# Patient Record
Sex: FEMALE | Race: WHITE
[De-identification: names, ages, dates, MRNs, and addresses within clinical notes are randomized per-mention and may not be internally consistent; named-entity substitution may affect disease eponyms.]

---

## 2019-07-14 ENCOUNTER — HOSPITAL ENCOUNTER (EMERGENCY)
Dept: HOSPITAL 8 - ED | Age: 34
Discharge: HOME | End: 2019-07-14
Payer: MEDICAID

## 2019-07-14 VITALS — BODY MASS INDEX: 21.83 KG/M2 | HEIGHT: 64 IN | WEIGHT: 127.87 LBS

## 2019-07-14 VITALS — DIASTOLIC BLOOD PRESSURE: 88 MMHG | SYSTOLIC BLOOD PRESSURE: 124 MMHG
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
## 2019-07-14 DIAGNOSIS — F17.200: ICD-10-CM

## 2019-07-14 DIAGNOSIS — S09.90XA: Primary | ICD-10-CM

## 2019-07-14 PROCEDURE — 70450 CT HEAD/BRAIN W/O DYE: CPT

## 2019-07-14 PROCEDURE — 99284 EMERGENCY DEPT VISIT MOD MDM: CPT

## 2019-07-14 NOTE — NUR
PT AMBULATORY TO ROOM WITH STEADY GAIT FROM [facility]. ASSUMING PT CARE AT THIS 
TIME. PT STATES SHE TRIPPED AND FELL AT APPROX 0930 THIS AM. HIT R SIDE OF 
HEAD, NO LAC OR BLEEDING NOTED. PT C/O HEAD AND LEFT LATERAL NECK PAIN. PT 
STATES SHE BECAME NAUSEAS AFTER FALL AND HAD BLURRED VISION WHICH SUBSIDED 
SHORTLY AFTER. PT DENIES LOC. PT STATES WHILE WAITING IN [facility] SHE HAD NUMBNESS 
ON LEFT SIDE OF BODY WHICH HAS SUBSIDED. OTHERWISE NEURO INTACT AT THIS TIME. 
RESTING ON GURNEY. FAMILY AT BEDSIDE. CALL LIGHT IN REACH. VSS.

## 2019-08-02 ENCOUNTER — GYNECOLOGY VISIT (OUTPATIENT)
Dept: OBGYN | Facility: CLINIC | Age: 34
End: 2019-08-02
Payer: MEDICAID

## 2019-08-02 ENCOUNTER — HOSPITAL ENCOUNTER (OUTPATIENT)
Dept: LAB | Facility: MEDICAL CENTER | Age: 34
End: 2019-08-02
Attending: NURSE PRACTITIONER
Payer: MEDICAID

## 2019-08-02 VITALS
BODY MASS INDEX: 22.02 KG/M2 | WEIGHT: 129 LBS | HEIGHT: 64 IN | DIASTOLIC BLOOD PRESSURE: 60 MMHG | SYSTOLIC BLOOD PRESSURE: 102 MMHG

## 2019-08-02 DIAGNOSIS — N93.8 DUB (DYSFUNCTIONAL UTERINE BLEEDING): ICD-10-CM

## 2019-08-02 DIAGNOSIS — Z32.01 POSITIVE URINE PREGNANCY TEST: Primary | ICD-10-CM

## 2019-08-02 DIAGNOSIS — Z32.01 POSITIVE URINE PREGNANCY TEST: ICD-10-CM

## 2019-08-02 LAB — B-HCG SERPL-ACNC: 4838.4 MIU/ML (ref 0–5)

## 2019-08-02 PROCEDURE — 84702 CHORIONIC GONADOTROPIN TEST: CPT

## 2019-08-02 PROCEDURE — 99202 OFFICE O/P NEW SF 15 MIN: CPT | Performed by: NURSE PRACTITIONER

## 2019-08-02 PROCEDURE — 36415 COLL VENOUS BLD VENIPUNCTURE: CPT

## 2019-08-02 RX ORDER — HYDROXYZINE HYDROCHLORIDE 25 MG/1
TABLET, FILM COATED ORAL
Refills: 1 | COMMUNITY
Start: 2019-05-29 | End: 2019-08-14

## 2019-08-02 NOTE — PROGRESS NOTES
"Maya Cervantes,  34 y.o.  female with Patient's last menstrual period was 2019. presents today with complaint of dysfunctional uterine bleeding.    Subjective : Patient presents to the office for absent menses.    Nausea/Vomiting: No    Abdominal /pelvic cramping: No  Vaginal bleeding: No  Positive home UPT no  Partner male, involved  Other symptoms: none    Pertinent positives documented in HPI and all other systems reviewed & are negative    OB History    Para Term  AB Living   3 2 2     2   SAB TAB Ectopic Molar Multiple Live Births             2      # Outcome Date GA Lbr Braxton/2nd Weight Sex Delivery Anes PTL Lv   3 Current            2 Term 06/08/10 40w0d  3.289 kg (7 lb 4 oz) M CS-Unspec   BENIGNO      Complications: Nuchal cord affecting delivery   1 Term 06 41w0d  3.345 kg (7 lb 6 oz) M Vag-Spont  N BENIGNO       Past Gyn history: last pap 2018    Past Medical History:   Diagnosis Date   • Anxiety    • Depression    • Fracture 2013    left leg       Past Surgical History:   Procedure Laterality Date   • HERNIA REPAIR     • PRIMARY C SECTION         Meds: zoloft, wellbutrin    Allergies: Patient has no known allergies.    Physical Exam:    /60 (BP Location: Right arm, Patient Position: Sitting)   Ht 1.626 m (5' 4\")   Wt 58.5 kg (129 lb)   LMP 2019   BMI 22.14 kg/m²   Gen: well-appearing, well-hydrated, well-nourished, in no apparent distress, pleasant and verbal  HEENT: normal;   PERRLA, EOMI, fundi grossly normal, no papilledema, no AV nicking, sclera clear  Lungs: Clear  Heart: RRR No M  Abd: abdomen is soft without significant tenderness, masses, organomegaly or guarding  Pelvic: deferred  Ext: NT bilaterally, no cyanosis, clubbing or edema    No results found for this or any previous visit (from the past 336 hour(s)).    Beside Ultrasound: yoke sac seen, no cardiac activity noted    Assessment:  34 y.o.   amenorrhea  Pregnancy exam/test " positive    Plan:   for dating  HCG quants  Normal pregnancy symptoms discussed  Call MD w/ questions or concerns

## 2019-08-02 NOTE — NON-PROVIDER
Patient here for GYN/DUB.  LMP=6/17/19  ANDREA=3/23/20  GA=6w4d  Last pap=1/2018=negative  Phone number: [phone]  Pharmacy verified  c/o

## 2019-08-05 ENCOUNTER — HOSPITAL ENCOUNTER (OUTPATIENT)
Dept: LAB | Facility: MEDICAL CENTER | Age: 34
End: 2019-08-05
Attending: NURSE PRACTITIONER
Payer: MEDICAID

## 2019-08-05 DIAGNOSIS — Z32.01 POSITIVE URINE PREGNANCY TEST: ICD-10-CM

## 2019-08-05 LAB — B-HCG SERPL-ACNC: ABNORMAL MIU/ML (ref 0–5)

## 2019-08-05 PROCEDURE — 36415 COLL VENOUS BLD VENIPUNCTURE: CPT

## 2019-08-05 PROCEDURE — 84702 CHORIONIC GONADOTROPIN TEST: CPT

## 2019-08-07 ENCOUNTER — INITIAL PRENATAL (OUTPATIENT)
Dept: OBGYN | Facility: CLINIC | Age: 34
End: 2019-08-07
Payer: MEDICAID

## 2019-08-07 VITALS
DIASTOLIC BLOOD PRESSURE: 62 MMHG | SYSTOLIC BLOOD PRESSURE: 100 MMHG | HEIGHT: 64 IN | BODY MASS INDEX: 21.85 KG/M2 | WEIGHT: 128 LBS

## 2019-08-07 DIAGNOSIS — O20.0 THREATENED ABORTION: ICD-10-CM

## 2019-08-07 PROCEDURE — 76830 TRANSVAGINAL US NON-OB: CPT | Performed by: OBSTETRICS & GYNECOLOGY

## 2019-08-07 PROCEDURE — 99213 OFFICE O/P EST LOW 20 MIN: CPT | Mod: 25 | Performed by: OBSTETRICS & GYNECOLOGY

## 2019-08-07 NOTE — PROGRESS NOTES
Pt here for  visit   # [phone]  Pt states having nausea and having some stretching pains on her abdominal area.

## 2019-08-07 NOTE — PROGRESS NOTES
"Subjective:      Maya Cervantes is a 34 y.o. female who presents  for fetal viability evaluation            HPI patient is a 34-year-old  3 para 2 with last menstrual period of 2019 who presents today for fetal viability study.  She had a new OB exam recently and a limited bedside scan by the midwife shows an intrauterine gestational sac with fetus was not visualized.    Patient is doing well except for some mild nausea without emesis.  She reports some lower pelvic cramping symptoms intermittently.  Denies any bleeding or spotting.  She is taking prenatal vitamins.  Pregnancy was unplanned but desired-patient was not using contraception    ROS all organ systems were reviewed and were negative except for complaints in HPI       Objective:     /62   Ht 1.626 m (5' 4\")   Wt 58.1 kg (128 lb)   LMP 2019   BMI 21.97 kg/m²       Physical Exam   Constitutional: She is oriented to person, place, and time. She appears well-developed and well-nourished. No distress.   Neck: Normal range of motion. Neck supple. No thyromegaly present.   Cardiovascular: Normal rate, regular rhythm, normal heart sounds and intact distal pulses.   No murmur heard.  Pulmonary/Chest: Effort normal and breath sounds normal. No stridor. No respiratory distress. She has no wheezes.   Abdominal: Soft. Bowel sounds are normal. She exhibits no distension and no mass. There is no guarding.   Neurological: She is alert and oriented to person, place, and time. No cranial nerve deficit.   Skin: Skin is warm and dry. No rash noted. She is not diaphoretic. No erythema.   Psychiatric: She has a normal mood and affect. Her behavior is normal. Thought content normal.   Nursing note and vitals reviewed.         Transvaginal ultrasound was performed and read by me:    Intrauterine gestational sac with yolk sac and a small area that appears to be fetal pole noted  No fetal cardiac activity was detected  There was an additional " saclike structures smaller than the yolk sac on the opposite side of the yolk sac.  Gestational sac diameter measures 1.53 cm corresponding to 6 weeks and 2 days gestation  Normal left adnexa noted  Right ovary contains a bilobed cyst.  The right ovary measures 5.12 x 3.39 cm larger cyst diameter measuring 2.53 cm  No pelvic free fluid noted    Impression: Intrauterine gestational sac with yolk sac and possible small fetal pole noted.  No fetal cardiac activity noted.  Patient has increasing beta-hCG levels.  We discussed that this could represent a blighted ovum versus an early IUP.  Repeat beta hCG level ordered for Friday and patient will follow-up next week for repeat ultrasound     Assessment/Plan:     1. Threatened   34-year-old with early IUP versus an embryonic demise.  Patient is currently stable.  We will repeat beta-hCG level on Friday and repeat ultrasound in 1 week  Precautions and plan of care reviewed  - HCG QUANTITATIVE; Future    2.  Right ovarian bilobed cyst noted on ultrasound.  Patient counseled on findings.  Cautions discussed

## 2019-08-09 ENCOUNTER — HOSPITAL ENCOUNTER (OUTPATIENT)
Dept: LAB | Facility: MEDICAL CENTER | Age: 34
End: 2019-08-09
Attending: OBSTETRICS & GYNECOLOGY
Payer: MEDICAID

## 2019-08-09 DIAGNOSIS — O20.0 THREATENED ABORTION: ICD-10-CM

## 2019-08-09 LAB — B-HCG SERPL-ACNC: ABNORMAL MIU/ML (ref 0–5)

## 2019-08-09 PROCEDURE — 36415 COLL VENOUS BLD VENIPUNCTURE: CPT

## 2019-08-09 PROCEDURE — 84702 CHORIONIC GONADOTROPIN TEST: CPT

## 2019-08-14 ENCOUNTER — INITIAL PRENATAL (OUTPATIENT)
Dept: OBGYN | Facility: CLINIC | Age: 34
End: 2019-08-14
Payer: MEDICAID

## 2019-08-14 VITALS — DIASTOLIC BLOOD PRESSURE: 58 MMHG | BODY MASS INDEX: 22.49 KG/M2 | WEIGHT: 131 LBS | SYSTOLIC BLOOD PRESSURE: 100 MMHG

## 2019-08-14 DIAGNOSIS — N93.8 DUB (DYSFUNCTIONAL UTERINE BLEEDING): ICD-10-CM

## 2019-08-14 DIAGNOSIS — Z32.01 POSITIVE PREGNANCY TEST: ICD-10-CM

## 2019-08-14 PROCEDURE — 76830 TRANSVAGINAL US NON-OB: CPT | Performed by: OBSTETRICS & GYNECOLOGY

## 2019-08-14 PROCEDURE — 99214 OFFICE O/P EST MOD 30 MIN: CPT | Mod: 25 | Performed by: OBSTETRICS & GYNECOLOGY

## 2019-08-14 NOTE — PROGRESS NOTES
CC: Missed menses    Maya Cervantes is a 34 y.o.  who presents presents due to missed menses. Patient's last menstrual period was 2019. Reports she first had a positive pregnancy test on 19 and was seen on  at which time US showed GS with YS but no definitive FP.   She was not using anything for birthcontrol.  This is a planned and desired pregnancy.   Reports she has been feeling fine thus far in pregnancy. She denies nausea/vomiting, denies headache, denies dysuria, denies  vaginal bleeding/spotting, and denies contractions/cramping.    Partner: Greg    Review of systems:  Pertinent positives documented in HPI and all other systems reviewed & are negative    GYN History:  Patient's last menstrual period was 2019.. Menarche @11.  Menses regular, lasting 5 days, not particularly heavy.  Last pap last year,  no h/o abnormal pap.  No history of cone biopsy, LEEP or any other cervical, uterine or gynecologic surgery. No history of sexually transmitted diseases.  Currently sexually active with one partner.  Utilizing nothing for contraception and has used OCPs in the past.     OB History:    OB History    Para Term  AB Living   3 2 2     2   SAB TAB Ectopic Molar Multiple Live Births             2      # Outcome Date GA Lbr Braxton/2nd Weight Sex Delivery Anes PTL Lv   3 Current            2 Term 06/08/10 40w0d  3.289 kg (7 lb 4 oz) M CS-Unspec   BENIGNO      Complications: Nuchal cord affecting delivery   1 Term 06 41w0d  3.345 kg (7 lb 6 oz) M Vag-Spont  N BENIGNO   CS for NRFS    All PMH, PSH, allergies, social history and FH reviewed and updated today:  Past Medical History:  Past Medical History:   Diagnosis Date   • Anxiety    • Depression    • Fracture 2013    left leg     Past Surgical History:  Past Surgical History:   Procedure Laterality Date   • HERNIA REPAIR     • PRIMARY C SECTION       Medications:   Current Outpatient Medications Ordered in Epic   Medication  Sig Dispense Refill   • sertraline (ZOLOFT) 50 MG Tab Take 50 mg by mouth every day.     • Naltrexone (VIVITROL) 380 MG Recon Susp by Intramuscular route.       No current Epic-ordered facility-administered medications on file.      Allergies: Patient has no known allergies.    Social History:  Social History     Socioeconomic History   • Marital status: Single     Spouse name: Not on file   • Number of children: Not on file   • Years of education: Not on file   • Highest education level: Not on file   Occupational History   • Not on file   Social Needs   • Financial resource strain: Not on file   • Food insecurity:     Worry: Not on file     Inability: Not on file   • Transportation needs:     Medical: Not on file     Non-medical: Not on file   Tobacco Use   • Smoking status: Light Tobacco Smoker   • Smokeless tobacco: Never Used   Substance and Sexual Activity   • Alcohol use: Not Currently     Comment: rare   • Drug use: No     Comment: was using heroin nothing since 5/2019   • Sexual activity: Yes     Partners: Male   Lifestyle   • Physical activity:     Days per week: Not on file     Minutes per session: Not on file   • Stress: Not on file   Relationships   • Social connections:     Talks on phone: Not on file     Gets together: Not on file     Attends [employer] service: Not on file     Active member of club or organization: Not on file     Attends meetings of clubs or organizations: Not on file     Relationship status: Not on file   • Intimate partner violence:     Fear of current or ex partner: Not on file     Emotionally abused: Not on file     Physically abused: Not on file     Forced sexual activity: Not on file   Other Topics Concern   • Not on file   Social History Narrative   • Not on file       Family History:  History reviewed. No pertinent family history.        Objective:   Vitals:  /58   Wt 59.4 kg (131 lb)   Body mass index is 22.49 kg/m². (Goal BM I>18 <25)  Exam:  General: appears stated  age  Head: normocephalic, non-tender  Neck: there is full range of motion  Abdomen: Bowel sounds positive, nondistended, soft, nontender x4, no rebound or guarding. No organomegaly. No masses.   Female GYN: normal female external genitalia without lesions  Skin: No rashes, or ulcers or lesions seen  Psychiatric: appropriate affect, alert and oriented x3, intact judgment and insight.    Procedure:  Transvaginal US performed by me and per my read:    Indication: dates/location.     Findings: twin intrauterine pregnancy @ 6w3d by CRL x2.  2 YS, one GS and do not see dividing membrane.  Embryo on maternal right/posterior:  CRL 6w3d; heart rate 123 bpm  Embryo on maternal left/anterior: CRL 6w2d;  heart rate 126 bpm   ANDREA 2020 Positive fetal cardiac activity @ 122bpm and  BPM.  Right ovary with simple cyst 3.2 x 3.8 cm. Left Ovary WNL.  Cervical length 4.4cm. No free fluid in the cul-de-sac.  Impression: viable TIUP @ 6w3d.  ANDREA by US of 20.    Recent Results (from the past 336 hour(s))   HCG QUANTITATIVE    Collection Time: 19  9:31 AM   Result Value Ref Range    Bhcg 4838.4 (H) 0.0 - 5.0 mIU/mL   HCG QUANTITATIVE    Collection Time: 19  2:25 PM   Result Value Ref Range    Bhcg 16135.8 (H) 0.0 - 5.0 mIU/mL   HCG QUANTITATIVE    Collection Time: 19  4:32 PM   Result Value Ref Range    Bhcg 45418.7 (H) 0.0 - 5.0 mIU/mL      Pregnancy exam/test positive    A/P:   34 y.o.  here for   1. Positive pregnancy test  US-OB 1ST TRIMESTER WITH TRANSVAGINAL (COMBO)   2. DUB (dysfunctional uterine bleeding)  US-OB 1ST TRIMESTER WITH TRANSVAGINAL (COMBO)       #Missed menses - amenorrhea due to pregnancy.  Twin intrauterine pregnancy seen on ultrasound today.  Better angle of twin measuring 6 weeks 3 days and therefore will date pregnancy based off this measurement.  This gives ANDREA of 2020.  No dividing membrane is seen in 2 yolk sacs are seen today.  Formal ultrasound has been ordered to establish  type of twin pregnancy.  This is discussed with patient and her partner today.  They are very excited and overwhelmed.  Precautions about SAB/vanishing twin are discussed with them today as well.    --Normal pregnancy s/s discussed  --Advised prenatal vitamins, healthy well rounded diet, adequate hydration, and continued exercise.    #Tobacco use.  Advised patient to avoid all tobacco products including vaping  #Will order prenatal labs and any additional imaging/testing needed at new OB visit  #SAB precautions discussed.  #Follow up in 2-4 weeks for new OB visit.    30 minutes were spent in face-to-face patient contact with patient, greater than 50% of which was was spent in counseling and coordination of care for her newly diagnosed pregnancy including medical and surgical options of care.    RISHABH

## 2019-08-21 ENCOUNTER — APPOINTMENT (OUTPATIENT)
Dept: RADIOLOGY | Facility: IMAGING CENTER | Age: 34
End: 2019-08-21
Attending: OBSTETRICS & GYNECOLOGY
Payer: MEDICAID

## 2019-08-21 DIAGNOSIS — Z32.01 POSITIVE PREGNANCY TEST: ICD-10-CM

## 2019-08-21 DIAGNOSIS — N93.8 DUB (DYSFUNCTIONAL UTERINE BLEEDING): ICD-10-CM

## 2019-08-21 PROCEDURE — 76802 OB US < 14 WKS ADDL FETUS: CPT | Mod: 59 | Performed by: OBSTETRICS & GYNECOLOGY

## 2019-08-21 PROCEDURE — 76801 OB US < 14 WKS SINGLE FETUS: CPT | Performed by: OBSTETRICS & GYNECOLOGY

## 2019-08-21 PROCEDURE — 76802 OB US < 14 WKS ADDL FETUS: CPT | Performed by: OBSTETRICS & GYNECOLOGY

## 2019-08-28 ENCOUNTER — ROUTINE PRENATAL (OUTPATIENT)
Dept: OBGYN | Facility: CLINIC | Age: 34
End: 2019-08-28
Payer: MEDICAID

## 2019-08-28 VITALS — SYSTOLIC BLOOD PRESSURE: 100 MMHG | WEIGHT: 132 LBS | BODY MASS INDEX: 22.66 KG/M2 | DIASTOLIC BLOOD PRESSURE: 60 MMHG

## 2019-08-28 DIAGNOSIS — O09.90 HIGH RISK FOR INTRAPARTUM COMPLICATIONS, ANTEPARTUM: Primary | ICD-10-CM

## 2019-08-28 DIAGNOSIS — N93.8 DUB (DYSFUNCTIONAL UTERINE BLEEDING): ICD-10-CM

## 2019-08-28 PROCEDURE — 90040 PR PRENATAL FOLLOW UP: CPT | Performed by: NURSE PRACTITIONER

## 2019-08-28 NOTE — PROGRESS NOTES
Patient here stating she has a lot of questions about her pregnancy.  Denies any problems. Aware she needs to schedule NOB visit.

## 2019-08-29 NOTE — PROGRESS NOTES
Maya and her partner are here today because they have many questions r/t a triplet pregnancy.  She is c/o difficulty sleeping.  Discussed with pt sleep hygiene including exercise, routine prior to bed, turning off cell phone, having a bath.  Also discussed with her the importance of healthy diet.  Discussed we are unable to tell at this time if all three babies were from a single sperm and egg.  Discussed need to f/u with Dr Shelton and referral placed. Discussed we have no set goal at this point as to length of pregnancy, but discussed the further gestation, the better. Aware I am unable to discuss plan for delivery at this time.  Also discussed activities, such as soccer and other contact sports should be avoided.  Discussed there is no reason at this time to refrain from sexual intercourse.    Discussed SAB precautions and pt to schedule new OB visit in next couple of weeks.

## 2019-09-04 NOTE — PROGRESS NOTES
Referral faxed to Dr. SANDHU on 9/4/19  They will contact patient to schedule appt.  Please check with patient if appt was made/ document. Thank you

## 2020-01-28 ENCOUNTER — INITIAL PRENATAL (OUTPATIENT)
Dept: OBGYN | Facility: CLINIC | Age: 35
End: 2020-01-28

## 2020-01-28 VITALS — WEIGHT: 138 LBS | DIASTOLIC BLOOD PRESSURE: 62 MMHG | BODY MASS INDEX: 23.69 KG/M2 | SYSTOLIC BLOOD PRESSURE: 104 MMHG

## 2020-01-28 DIAGNOSIS — N91.2 AMENORRHEA: ICD-10-CM

## 2020-01-28 PROCEDURE — 99203 OFFICE O/P NEW LOW 30 MIN: CPT | Performed by: OBSTETRICS & GYNECOLOGY

## 2020-01-28 NOTE — PROGRESS NOTES
Cc: Confirmation of pregnancy    HPI:  The patient is a 35 y.o.  32w1d based upon  Patient's last menstrual period was 2019.. She was using no birth control method.  Patient reports last menstrual period was end of October begin 2019.  No vaginal bleeding since.  She reports a positive pregnancy test in 2019.  No pregnancy test have been taken since then.  She denies any nausea, vomiting, diarrhea, chest pain, shortness of breath or vaginal bleeding.    Urine pregnancy test today in clinic was negative    Patient underwent elective termination in 2019.      She presents for a confirmation of pregnancy.  She denies  fetal movement,  denies  vaginal bleeding,  denies  leakage of fluid,  denies contractions.   She denies nausea/vomiting, denies headache, and denies dysuria.      Review of systems:  Pertinent positives documented in HPI and all other systems reviewed & are negative    OB History    Para Term  AB Living   3 2 2     2   SAB TAB Ectopic Molar Multiple Live Births             2      # Outcome Date GA Lbr Braxton/2nd Weight Sex Delivery Anes PTL Lv   3 Current            2 Term 06/08/10 40w0d  3.289 kg (7 lb 4 oz) M CS-Unspec   BENIGNO      Complications: Nuchal cord affecting delivery   1 Term 06 41w0d  3.345 kg (7 lb 6 oz) M Vag-Spont  N BENIGNO     Past Medical History:   Diagnosis Date   • Anxiety    • Depression    • Fracture 2013    left leg     Past Surgical History:   Procedure Laterality Date   • HERNIA REPAIR     • PRIMARY C SECTION       Social History     Socioeconomic History   • Marital status: Single     Spouse name: Not on file   • Number of children: Not on file   • Years of education: Not on file   • Highest education level: Not on file   Occupational History   • Not on file   Social Needs   • Financial resource strain: Not on file   • Food insecurity:     Worry: Not on file     Inability: Not on file   • Transportation needs:     Medical:  Not on file     Non-medical: Not on file   Tobacco Use   • Smoking status: Light Tobacco Smoker   • Smokeless tobacco: Never Used   Substance and Sexual Activity   • Alcohol use: Not Currently     Comment: rare   • Drug use: No     Comment: was using heroin nothing since 5/2019   • Sexual activity: Yes     Partners: Male   Lifestyle   • Physical activity:     Days per week: Not on file     Minutes per session: Not on file   • Stress: Not on file   Relationships   • Social connections:     Talks on phone: Not on file     Gets together: Not on file     Attends Anabaptism service: Not on file     Active member of club or organization: Not on file     Attends meetings of clubs or organizations: Not on file     Relationship status: Not on file   • Intimate partner violence:     Fear of current or ex partner: Not on file     Emotionally abused: Not on file     Physically abused: Not on file     Forced sexual activity: Not on file   Other Topics Concern   • Not on file   Social History Narrative   • Not on file     No family history on file.  Allergies:   Allergies as of 01/28/2020   • (No Known Allergies)       PE:    There were no vitals taken for this visit.      General:appears stated age, is in no apparent distress, is well developed and well nourished  Head: normocephalic, non-tender  Neck: neck is supple  Abdomen: Bowel sounds positive, nondistended, soft, nontender x4, no rebound or guarding. No organomegaly. No masses.  Female GYN: normal female external genitalia without lesionsnormal vagina and normal vaginal tone, normal cervix, normal uterus, size and consistency, normal adnexa without tenderness  Skin: No rashes, or ulcers or lesions seen  Psychiatric: Patient shows appropriate affect, is alert and oriented x3, intact judgment and insight.    Transvaginal US performed and per my read:    Indication: Dating/Viability    Findings: Normal-appearing uterine cavity with no gestational sac, fetal pole or yolk sac  noted.  Right ovary normal. Left Ovary normal. Cervical length 4 cm. No free fluid in the cul-de-sac.    Impression: No evidence of intrauterine pregnancy.      A/P:   1. Amenorrhea  HCG QUANTITATIVE       1.  We will obtain serum hCG levels for final diagnosis.  Will call patient with results    If negative, patient interested in birth control as she does not desire any further pregnancies.  Follow-up for birth control counseling if negative.    If patient continues to have no vaginal bleeding, may need evaluation with [facility] due to recent uterine cementation/curettage and possibility of Asherman syndrome.    All questions answered

## 2020-01-31 ENCOUNTER — HOSPITAL ENCOUNTER (OUTPATIENT)
Dept: LAB | Facility: MEDICAL CENTER | Age: 35
End: 2020-01-31
Attending: OBSTETRICS & GYNECOLOGY
Payer: MEDICAID

## 2020-01-31 DIAGNOSIS — N91.2 AMENORRHEA: ICD-10-CM

## 2020-01-31 LAB — B-HCG SERPL-ACNC: 0.7 MIU/ML (ref 0–5)

## 2020-01-31 PROCEDURE — 84702 CHORIONIC GONADOTROPIN TEST: CPT

## 2020-01-31 PROCEDURE — 36415 COLL VENOUS BLD VENIPUNCTURE: CPT

## 2020-02-02 ENCOUNTER — APPOINTMENT (OUTPATIENT)
Dept: RADIOLOGY | Facility: MEDICAL CENTER | Age: 35
End: 2020-02-02
Attending: EMERGENCY MEDICINE
Payer: MEDICAID

## 2020-02-02 ENCOUNTER — HOSPITAL ENCOUNTER (EMERGENCY)
Facility: MEDICAL CENTER | Age: 35
End: 2020-02-02
Attending: EMERGENCY MEDICINE
Payer: MEDICAID

## 2020-02-02 VITALS
RESPIRATION RATE: 16 BRPM | DIASTOLIC BLOOD PRESSURE: 60 MMHG | TEMPERATURE: 98.4 F | HEIGHT: 64 IN | WEIGHT: 139.55 LBS | BODY MASS INDEX: 23.82 KG/M2 | HEART RATE: 49 BPM | SYSTOLIC BLOOD PRESSURE: 99 MMHG | OXYGEN SATURATION: 99 %

## 2020-02-02 DIAGNOSIS — O03.9 MISCARRIAGE: ICD-10-CM

## 2020-02-02 LAB
ALBUMIN SERPL BCP-MCNC: 4.1 G/DL (ref 3.2–4.9)
ALBUMIN/GLOB SERPL: 1.3 G/DL
ALP SERPL-CCNC: 65 U/L (ref 30–99)
ALT SERPL-CCNC: 22 U/L (ref 2–50)
ANION GAP SERPL CALC-SCNC: 7 MMOL/L (ref 0–11.9)
AST SERPL-CCNC: 24 U/L (ref 12–45)
B-HCG SERPL-ACNC: 0.8 MIU/ML (ref 0–5)
BASOPHILS # BLD AUTO: 0.6 % (ref 0–1.8)
BASOPHILS # BLD: 0.04 K/UL (ref 0–0.12)
BILIRUB SERPL-MCNC: 0.6 MG/DL (ref 0.1–1.5)
BUN SERPL-MCNC: 15 MG/DL (ref 8–22)
CALCIUM SERPL-MCNC: 9.5 MG/DL (ref 8.5–10.5)
CHLORIDE SERPL-SCNC: 107 MMOL/L (ref 96–112)
CO2 SERPL-SCNC: 26 MMOL/L (ref 20–33)
CREAT SERPL-MCNC: 0.86 MG/DL (ref 0.5–1.4)
EOSINOPHIL # BLD AUTO: 0.06 K/UL (ref 0–0.51)
EOSINOPHIL NFR BLD: 0.9 % (ref 0–6.9)
ERYTHROCYTE [DISTWIDTH] IN BLOOD BY AUTOMATED COUNT: 46.8 FL (ref 35.9–50)
GLOBULIN SER CALC-MCNC: 3.1 G/DL (ref 1.9–3.5)
GLUCOSE SERPL-MCNC: 103 MG/DL (ref 65–99)
HCT VFR BLD AUTO: 41.7 % (ref 37–47)
HGB BLD-MCNC: 13.6 G/DL (ref 12–16)
IMM GRANULOCYTES # BLD AUTO: 0.02 K/UL (ref 0–0.11)
IMM GRANULOCYTES NFR BLD AUTO: 0.3 % (ref 0–0.9)
LYMPHOCYTES # BLD AUTO: 2.24 K/UL (ref 1–4.8)
LYMPHOCYTES NFR BLD: 34.7 % (ref 22–41)
MCH RBC QN AUTO: 32 PG (ref 27–33)
MCHC RBC AUTO-ENTMCNC: 32.6 G/DL (ref 33.6–35)
MCV RBC AUTO: 98.1 FL (ref 81.4–97.8)
MONOCYTES # BLD AUTO: 0.4 K/UL (ref 0–0.85)
MONOCYTES NFR BLD AUTO: 6.2 % (ref 0–13.4)
NEUTROPHILS # BLD AUTO: 3.7 K/UL (ref 2–7.15)
NEUTROPHILS NFR BLD: 57.3 % (ref 44–72)
NRBC # BLD AUTO: 0 K/UL
NRBC BLD-RTO: 0 /100 WBC
NUMBER OF RH DOSES IND 8505RD: NORMAL
PLATELET # BLD AUTO: 206 K/UL (ref 164–446)
PMV BLD AUTO: 10.4 FL (ref 9–12.9)
POTASSIUM SERPL-SCNC: 3.9 MMOL/L (ref 3.6–5.5)
PROT SERPL-MCNC: 7.2 G/DL (ref 6–8.2)
RBC # BLD AUTO: 4.25 M/UL (ref 4.2–5.4)
RH BLD: NORMAL
SODIUM SERPL-SCNC: 140 MMOL/L (ref 135–145)
WBC # BLD AUTO: 6.5 K/UL (ref 4.8–10.8)

## 2020-02-02 PROCEDURE — 80053 COMPREHEN METABOLIC PANEL: CPT

## 2020-02-02 PROCEDURE — 99284 EMERGENCY DEPT VISIT MOD MDM: CPT

## 2020-02-02 PROCEDURE — 76801 OB US < 14 WKS SINGLE FETUS: CPT

## 2020-02-02 PROCEDURE — 84702 CHORIONIC GONADOTROPIN TEST: CPT

## 2020-02-02 PROCEDURE — 85025 COMPLETE CBC W/AUTO DIFF WBC: CPT

## 2020-02-02 PROCEDURE — 86901 BLOOD TYPING SEROLOGIC RH(D): CPT

## 2020-02-02 NOTE — ED NOTES
Discharge instructions given, all questions answered, pt stated understanding.Pt able to ambulate discharged to self care.

## 2020-02-02 NOTE — ED PROVIDER NOTES
ED Provider Note    Scribed for Sean Rodriguez M.D. by Cheyenne Sanabria. 2020, 10:31 AM.    Primary care provider: None noted  Means of arrival: Walk-in  History obtained from: Patient  History limited by: None    CHIEF COMPLAINT  Chief Complaint   Patient presents with   • Abdominal Cramping   • Vaginal Bleeding       HPI  Maya Cervantes is a 35 y.o. female who presents to the Emergency Department for vaginal bleeding onset today. She has associated symptoms of abdominal cramping and some pain. She noticed some spotting two days ago, however more blood was present today. Patient believes that she is having a miscarriage. She previously had 2-3 positive pregnancy tests last week and had a negative one on Tuesday. This prompted her to get an HCG-test on Friday, which was negative. Her last menstrual period was in October. Her obstetrics history is  A1. She did take any medicine for the pain today. She follows up with the pregnancy center and is currently enrolled in step 2.     REVIEW OF SYSTEMS  See HPI for further details. All other systems are negative.     PAST MEDICAL HISTORY   has a past medical history of Anxiety, Depression, and Fracture ().    SURGICAL HISTORY   has a past surgical history that includes primary c section and hernia repair.    SOCIAL HISTORY  Social History     Tobacco Use   • Smoking status: Former Smoker     Packs/day: 0.00   • Smokeless tobacco: Never Used   Substance Use Topics   • Alcohol use: Not Currently     Comment: rare   • Drug use: No     Comment: was using heroin nothing since 2019      Social History     Substance and Sexual Activity   Drug Use No    Comment: was using heroin nothing since 2019       FAMILY HISTORY  History reviewed. No pertinent family history.    CURRENT MEDICATIONS  Reviewed.  See Encounter Summary.     ALLERGIES  No Known Allergies    PHYSICAL EXAM  VITAL SIGNS: /71   Pulse (!) 57   Temp 36.9 °C (98.4 °F) (Temporal)   Resp  "16   Ht 1.626 m (5' 4\")   Wt 63.3 kg (139 lb 8.8 oz)   LMP 10/30/2019   SpO2 98%   BMI 23.95 kg/m²   Constitutional: Alert in no apparent distress.  HENT: No signs of trauma, Bilateral external ears normal, Nose normal.   Eyes: Pupils are equal and reactive, Conjunctiva normal, Non-icteric.   Neck: Normal range of motion, No tenderness, Supple, No stridor.   Cardiovascular: Regular rate and rhythm, no murmurs.   Thorax & Lungs: Normal breath sounds, No respiratory distress, No wheezing, No chest tenderness.   Abdomen: Bowel sounds normal, Soft, No tenderness, No masses, No pulsatile masses. No peritoneal signs.  Skin: Warm, Dry, No erythema, No rash.   Back: No bony tenderness, No CVA tenderness.   Extremities: Intact distal pulses, No edema, No tenderness, No cyanosis  Musculoskeletal: Good range of motion in all major joints. No tenderness to palpation or major deformities noted.   Neurologic: Alert , Normal motor function, Normal sensory function, No focal deficits noted.   Psychiatric: Affect normal, Judgment normal, Mood normal.       DIAGNOSTIC STUDIES / PROCEDURES     LABS  Results for orders placed or performed during the hospital encounter of 02/02/20   CBC WITH DIFFERENTIAL   Result Value Ref Range    WBC 6.5 4.8 - 10.8 K/uL    RBC 4.25 4.20 - 5.40 M/uL    Hemoglobin 13.6 12.0 - 16.0 g/dL    Hematocrit 41.7 37.0 - 47.0 %    MCV 98.1 (H) 81.4 - 97.8 fL    MCH 32.0 27.0 - 33.0 pg    MCHC 32.6 (L) 33.6 - 35.0 g/dL    RDW 46.8 35.9 - 50.0 fL    Platelet Count 206 164 - 446 K/uL    MPV 10.4 9.0 - 12.9 fL    Neutrophils-Polys 57.30 44.00 - 72.00 %    Lymphocytes 34.70 22.00 - 41.00 %    Monocytes 6.20 0.00 - 13.40 %    Eosinophils 0.90 0.00 - 6.90 %    Basophils 0.60 0.00 - 1.80 %    Immature Granulocytes 0.30 0.00 - 0.90 %    Nucleated RBC 0.00 /100 WBC    Neutrophils (Absolute) 3.70 2.00 - 7.15 K/uL    Lymphs (Absolute) 2.24 1.00 - 4.80 K/uL    Monos (Absolute) 0.40 0.00 - 0.85 K/uL    Eos (Absolute) 0.06 " 0.00 - 0.51 K/uL    Baso (Absolute) 0.04 0.00 - 0.12 K/uL    Immature Granulocytes (abs) 0.02 0.00 - 0.11 K/uL    NRBC (Absolute) 0.00 K/uL   HCG QUANTITATIVE SERUM   Result Value Ref Range    Bhcg 0.8 0.0 - 5.0 mIU/mL   COMP METABOLIC PANEL   Result Value Ref Range    Sodium 140 135 - 145 mmol/L    Potassium 3.9 3.6 - 5.5 mmol/L    Chloride 107 96 - 112 mmol/L    Co2 26 20 - 33 mmol/L    Anion Gap 7.0 0.0 - 11.9    Glucose 103 (H) 65 - 99 mg/dL    Bun 15 8 - 22 mg/dL    Creatinine 0.86 0.50 - 1.40 mg/dL    Calcium 9.5 8.5 - 10.5 mg/dL    AST(SGOT) 24 12 - 45 U/L    ALT(SGPT) 22 2 - 50 U/L    Alkaline Phosphatase 65 30 - 99 U/L    Total Bilirubin 0.6 0.1 - 1.5 mg/dL    Albumin 4.1 3.2 - 4.9 g/dL    Total Protein 7.2 6.0 - 8.2 g/dL    Globulin 3.1 1.9 - 3.5 g/dL    A-G Ratio 1.3 g/dL   RH TYPE FOR RHOGAM FROM E.D.   Result Value Ref Range    Emergency Department Rh Typing POS     Number Of Rh Doses Indicated ZERO    ESTIMATED GFR   Result Value Ref Range    GFR If African American >60 >60 mL/min/1.73 m 2    GFR If Non African American >60 >60 mL/min/1.73 m 2       All labs were reviewed by me.    RADIOLOGY  US-OB 1ST TRIMESTER WITH TRANSVAGINAL (COMBO)   Final Result      1.  No intrauterine pregnancy seen. If the patient does have a positive pregnancy test, differential includes early pregnancy, complete , and ectopic pregnancy. Correlation with quantitative beta-hCG and follow-up ultrasound is recommended.      2.  11 mm complex cyst involving the right ovary likely representing a hemorrhagic cyst.           The radiologist's interpretation of all radiological studies and images have been reviewed by me.    COURSE & MEDICAL DECISION MAKING  Pertinent Labs & Imaging studies reviewed. (See chart for details)    10:31 AM - Patient seen and examined at bedside. I informed the patient that she should follow up at the pregnancy center. I will ensure that everything passes and that there is no infection present  here in the ED. Ordered US-OB 1st trimester, estimated GFR, CBC with diff, HCG quantitative, CMP, and RH type for rhogam to evaluate her symptoms.     12:00 PM - Patient was reevaluated at bedside. Discussed lab and radiology results with the patient and informed them there was no visible intrauterine pregnancy on the US and that HCG was negative. I will discharge the patient home. Patient verbalizes understanding and agreement to this plan of care.      Decision Making:  This is a 35 y.o. year old female who presents with above complaint.  Patient with likely resolved miscarriage.  No evidence of retained products of conception.  White count is negative.  Beta-hCG is negative.  I referred her back to the pregnancy center for ongoing outpatient care.    The patient will return for new or worsening symptoms and is stable at the time of discharge.    The patient is referred to a primary physician for blood pressure management, diabetic screening, and for all other preventative health concerns.    DISPOSITION:  Patient will be discharged home in stable condition.    FOLLOW UP:  [facility]  [address] [place] [address] [phone]          FINAL IMPRESSION  1. Miscarriage          Cheyenne KRISHNAMURTHY (Juan Cibhipolito), am scribing for, and in the presence of, Sean Rodriguez M.D..    Electronically signed by: Cheyenne Sanabria (Britta), 2/2/2020    Sean KRISHNAMURTHY M.D. personally performed the services described in this documentation, as scribed by Cheyenne Sanabria in my presence, and it is both accurate and complete.  C  The note accurately reflects work and decisions made by me.  Sean Rodriguez M.D.  2/2/2020  4:32 PM

## 2020-02-02 NOTE — ED TRIAGE NOTES
Amb to triage w/ c/o abd cramping & vaginal bleeding x 2days.  Pt states she is approx 12 weeks pregnant, LMP 10/30.  Pt had 2 positive urine pregnancy tests in December, 1 pos in January, but a negative urine preg on 1/28 and neg Bhcg on 1/31.  No distress noted.

## 2020-02-07 ENCOUNTER — GYNECOLOGY VISIT (OUTPATIENT)
Dept: OBGYN | Facility: CLINIC | Age: 35
End: 2020-02-07
Payer: MEDICAID

## 2020-02-07 VITALS — SYSTOLIC BLOOD PRESSURE: 100 MMHG | WEIGHT: 139 LBS | DIASTOLIC BLOOD PRESSURE: 62 MMHG | BODY MASS INDEX: 23.86 KG/M2

## 2020-02-07 DIAGNOSIS — O03.9 SAB (SPONTANEOUS ABORTION): ICD-10-CM

## 2020-02-07 PROCEDURE — 99212 OFFICE O/P EST SF 10 MIN: CPT | Performed by: OBSTETRICS & GYNECOLOGY

## 2020-02-07 RX ORDER — NORGESTIMATE AND ETHINYL ESTRADIOL 0.25-0.035
1 KIT ORAL DAILY
Qty: 90 TAB | Refills: 3 | Status: SHIPPED | OUTPATIENT
Start: 2020-02-07 | End: 2021-02-07

## 2020-02-07 NOTE — PROGRESS NOTES
Chief Complaint   Patient presents with   • Gynecologic Exam     ER follow up       History of present illness: 35 y.o. presents status post SAB.  She had spontaneous passage of products and was seen in the emergency room 5 days ago.  Beta-hCG at the time was 0.8.  Today her urine pregnancy test was negative.  She states the bleeding decreased until it was very very light.  She is requesting some form of birth control as she does not want this to happen again.    Review of systems:  Pertinent positives documented in HPI and all other systems reviewed & are negative    POBHx:   OB History    Para Term  AB Living   3 2 2     2   SAB TAB Ectopic Molar Multiple Live Births             2      # Outcome Date GA Lbr Braxton/2nd Weight Sex Delivery Anes PTL Lv   3 Current            2 Term 06/08/10 40w0d  3.289 kg (7 lb 4 oz) M CS-Unspec   BENIGNO      Complications: Nuchal cord affecting delivery   1 Term 06 41w0d  3.345 kg (7 lb 6 oz) M Vag-Spont  N BENIGNO     All PMH, PSH, allergies, social history and FH reviewed and updated today:  Past Medical History:   Diagnosis Date   • Anxiety    • Depression    • Fracture 2013    left leg       Past Surgical History:   Procedure Laterality Date   • HERNIA REPAIR     • PRIMARY C SECTION         Allergies: No Known Allergies    Social History     Socioeconomic History   • Marital status: Single     Spouse name: Not on file   • Number of children: Not on file   • Years of education: Not on file   • Highest education level: Not on file   Occupational History   • Not on file   Social Needs   • Financial resource strain: Not on file   • Food insecurity:     Worry: Not on file     Inability: Not on file   • Transportation needs:     Medical: Not on file     Non-medical: Not on file   Tobacco Use   • Smoking status: Former Smoker     Packs/day: 0.00   • Smokeless tobacco: Never Used   Substance and Sexual Activity   • Alcohol use: Not Currently     Comment: rare   • Drug use: No      Comment: was using heroin nothing since 2019   • Sexual activity: Yes     Partners: Male   Lifestyle   • Physical activity:     Days per week: Not on file     Minutes per session: Not on file   • Stress: Not on file   Relationships   • Social connections:     Talks on phone: Not on file     Gets together: Not on file     Attends Restorationist service: Not on file     Active member of club or organization: Not on file     Attends meetings of clubs or organizations: Not on file     Relationship status: Not on file   • Intimate partner violence:     Fear of current or ex partner: Not on file     Emotionally abused: Not on file     Physically abused: Not on file     Forced sexual activity: Not on file   Other Topics Concern   • Not on file   Social History Narrative   • Not on file       Family History   Problem Relation Age of Onset   • No Known Problems Father    • No Known Problems Mother      Physical exam:  /62   Wt 63 kg (139 lb)     General:appears stated age, is in no apparent distress  Head: normocephalic, non-tender  Neck: neck is supple, no jugular venous distension  Abdomen: Bowel sounds positive, nondistended, soft, nontender x4, no rebound or guarding. No organomegaly. No masses.  Female GYN: normal female external genitalia without lesions  normal vagina and normal vaginal tone, normal cervix, normal uterus, size and consistency.  Cervix appears closed thick, there is a trace amount of light brown discharge.  There is no CMT or fundal tenderness.  The uterus is mildly adherent to the anterior abdominal wall and anteverted.  There are no adnexal masses bilaterally  Skin: No rashes, or ulcers or lesions seen  Psychiatric: Patient shows appropriate affect, is alert and oriented x3, intact judgment and insight.      Assessment  1. SAB (spontaneous )         Plan  - 35 y.o.  here status post passage of spontaneous .  -Pregnancy test in the urine today is negative.  Appears as  though AB has passed  -Patient would like to start combined contraceptive hormone pill today.  Previously she was on Sprintec and this was ordered (generic Ortho-Cyclen).  Advised not to miss any pills.  -Patient denies a history of thromboembolic disease.  Patient does not smoke cigarettes.  - Follow up 1 year or sooner if needed